# Patient Record
Sex: MALE | Race: OTHER | NOT HISPANIC OR LATINO | URBAN - METROPOLITAN AREA
[De-identification: names, ages, dates, MRNs, and addresses within clinical notes are randomized per-mention and may not be internally consistent; named-entity substitution may affect disease eponyms.]

---

## 2017-11-03 ENCOUNTER — INPATIENT (INPATIENT)
Facility: HOSPITAL | Age: 52
LOS: 0 days | Discharge: HOME | DRG: 872 | End: 2017-11-03
Attending: HOSPITALIST | Admitting: HOSPITALIST
Payer: MEDICAID

## 2017-11-03 ENCOUNTER — TRANSCRIPTION ENCOUNTER (OUTPATIENT)
Age: 52
End: 2017-11-03

## 2017-11-03 VITALS
SYSTOLIC BLOOD PRESSURE: 144 MMHG | TEMPERATURE: 103 F | HEART RATE: 118 BPM | RESPIRATION RATE: 20 BRPM | OXYGEN SATURATION: 96 % | DIASTOLIC BLOOD PRESSURE: 85 MMHG

## 2017-11-03 VITALS
TEMPERATURE: 100 F | RESPIRATION RATE: 20 BRPM | DIASTOLIC BLOOD PRESSURE: 81 MMHG | OXYGEN SATURATION: 98 % | HEART RATE: 85 BPM | SYSTOLIC BLOOD PRESSURE: 129 MMHG

## 2017-11-03 DIAGNOSIS — A41.9 SEPSIS, UNSPECIFIED ORGANISM: ICD-10-CM

## 2017-11-03 DIAGNOSIS — R50.9 FEVER, UNSPECIFIED: ICD-10-CM

## 2017-11-03 DIAGNOSIS — Z29.9 ENCOUNTER FOR PROPHYLACTIC MEASURES, UNSPECIFIED: ICD-10-CM

## 2017-11-03 DIAGNOSIS — B34.1 ENTEROVIRUS INFECTION, UNSPECIFIED: ICD-10-CM

## 2017-11-03 DIAGNOSIS — I10 ESSENTIAL (PRIMARY) HYPERTENSION: ICD-10-CM

## 2017-11-03 LAB
ALBUMIN SERPL ELPH-MCNC: 4.3 G/DL — SIGNIFICANT CHANGE UP (ref 3.3–5)
ALP SERPL-CCNC: 45 U/L — SIGNIFICANT CHANGE UP (ref 40–120)
ALT FLD-CCNC: 40 U/L RC — SIGNIFICANT CHANGE UP (ref 10–45)
ANION GAP SERPL CALC-SCNC: 14 MMOL/L — SIGNIFICANT CHANGE UP (ref 5–17)
APPEARANCE UR: CLEAR — SIGNIFICANT CHANGE UP
APTT BLD: 34.6 SEC — SIGNIFICANT CHANGE UP (ref 27.5–37.4)
AST SERPL-CCNC: 27 U/L — SIGNIFICANT CHANGE UP (ref 10–40)
BASE EXCESS BLDV CALC-SCNC: 1.5 MMOL/L — SIGNIFICANT CHANGE UP (ref -2–2)
BASOPHILS # BLD AUTO: 0 K/UL — SIGNIFICANT CHANGE UP (ref 0–0.2)
BASOPHILS NFR BLD AUTO: 0.2 % — SIGNIFICANT CHANGE UP (ref 0–2)
BILIRUB SERPL-MCNC: 0.7 MG/DL — SIGNIFICANT CHANGE UP (ref 0.2–1.2)
BILIRUB UR-MCNC: NEGATIVE — SIGNIFICANT CHANGE UP
BUN SERPL-MCNC: 11 MG/DL — SIGNIFICANT CHANGE UP (ref 7–23)
CALCIUM SERPL-MCNC: 8.8 MG/DL — SIGNIFICANT CHANGE UP (ref 8.4–10.5)
CHLORIDE SERPL-SCNC: 98 MMOL/L — SIGNIFICANT CHANGE UP (ref 96–108)
CO2 BLDV-SCNC: 26 MMOL/L — SIGNIFICANT CHANGE UP (ref 22–30)
CO2 SERPL-SCNC: 24 MMOL/L — SIGNIFICANT CHANGE UP (ref 22–31)
COLOR SPEC: SIGNIFICANT CHANGE UP
CREAT SERPL-MCNC: 1.25 MG/DL — SIGNIFICANT CHANGE UP (ref 0.5–1.3)
DIFF PNL FLD: ABNORMAL
EOSINOPHIL # BLD AUTO: 0 K/UL — SIGNIFICANT CHANGE UP (ref 0–0.5)
EOSINOPHIL NFR BLD AUTO: 0.1 % — SIGNIFICANT CHANGE UP (ref 0–6)
GAS PNL BLDV: SIGNIFICANT CHANGE UP
GAS PNL BLDV: SIGNIFICANT CHANGE UP
GLUCOSE SERPL-MCNC: 111 MG/DL — HIGH (ref 70–99)
GLUCOSE UR QL: NEGATIVE — SIGNIFICANT CHANGE UP
HCO3 BLDV-SCNC: 25 MMOL/L — SIGNIFICANT CHANGE UP (ref 21–29)
HCT VFR BLD CALC: 45.9 % — SIGNIFICANT CHANGE UP (ref 39–50)
HGB BLD-MCNC: 15.1 G/DL — SIGNIFICANT CHANGE UP (ref 13–17)
INR BLD: 1.16 RATIO — SIGNIFICANT CHANGE UP (ref 0.88–1.16)
KETONES UR-MCNC: NEGATIVE — SIGNIFICANT CHANGE UP
LEUKOCYTE ESTERASE UR-ACNC: NEGATIVE — SIGNIFICANT CHANGE UP
LYMPHOCYTES # BLD AUTO: 1.4 K/UL — SIGNIFICANT CHANGE UP (ref 1–3.3)
LYMPHOCYTES # BLD AUTO: 19.9 % — SIGNIFICANT CHANGE UP (ref 13–44)
MCHC RBC-ENTMCNC: 30.9 PG — SIGNIFICANT CHANGE UP (ref 27–34)
MCHC RBC-ENTMCNC: 32.9 GM/DL — SIGNIFICANT CHANGE UP (ref 32–36)
MCV RBC AUTO: 93.8 FL — SIGNIFICANT CHANGE UP (ref 80–100)
MONOCYTES # BLD AUTO: 0.9 K/UL — SIGNIFICANT CHANGE UP (ref 0–0.9)
MONOCYTES NFR BLD AUTO: 12.9 % — SIGNIFICANT CHANGE UP (ref 2–14)
NEUTROPHILS # BLD AUTO: 4.6 K/UL — SIGNIFICANT CHANGE UP (ref 1.8–7.4)
NEUTROPHILS NFR BLD AUTO: 66.9 % — SIGNIFICANT CHANGE UP (ref 43–77)
NITRITE UR-MCNC: NEGATIVE — SIGNIFICANT CHANGE UP
PCO2 BLDV: 38 MMHG — SIGNIFICANT CHANGE UP (ref 35–50)
PH BLDV: 7.43 — SIGNIFICANT CHANGE UP (ref 7.35–7.45)
PH UR: 6.5 — SIGNIFICANT CHANGE UP (ref 5–8)
PLATELET # BLD AUTO: 158 K/UL — SIGNIFICANT CHANGE UP (ref 150–400)
PO2 BLDV: 33 MMHG — SIGNIFICANT CHANGE UP (ref 25–45)
POTASSIUM SERPL-MCNC: 3.8 MMOL/L — SIGNIFICANT CHANGE UP (ref 3.5–5.3)
POTASSIUM SERPL-SCNC: 3.8 MMOL/L — SIGNIFICANT CHANGE UP (ref 3.5–5.3)
PROT SERPL-MCNC: 7 G/DL — SIGNIFICANT CHANGE UP (ref 6–8.3)
PROT UR-MCNC: NEGATIVE — SIGNIFICANT CHANGE UP
PROTHROM AB SERPL-ACNC: 12.7 SEC — SIGNIFICANT CHANGE UP (ref 9.8–12.7)
RAPID RVP RESULT: DETECTED
RBC # BLD: 4.89 M/UL — SIGNIFICANT CHANGE UP (ref 4.2–5.8)
RBC # FLD: 12.4 % — SIGNIFICANT CHANGE UP (ref 10.3–14.5)
RV+EV RNA SPEC QL NAA+PROBE: DETECTED
SAO2 % BLDV: 64 % — LOW (ref 67–88)
SODIUM SERPL-SCNC: 136 MMOL/L — SIGNIFICANT CHANGE UP (ref 135–145)
SP GR SPEC: 1.01 — LOW (ref 1.01–1.02)
UROBILINOGEN FLD QL: NEGATIVE — SIGNIFICANT CHANGE UP
WBC # BLD: 6.8 K/UL — SIGNIFICANT CHANGE UP (ref 3.8–10.5)
WBC # FLD AUTO: 6.8 K/UL — SIGNIFICANT CHANGE UP (ref 3.8–10.5)

## 2017-11-03 PROCEDURE — 85610 PROTHROMBIN TIME: CPT

## 2017-11-03 PROCEDURE — 82803 BLOOD GASES ANY COMBINATION: CPT

## 2017-11-03 PROCEDURE — 99285 EMERGENCY DEPT VISIT HI MDM: CPT | Mod: 25

## 2017-11-03 PROCEDURE — 85730 THROMBOPLASTIN TIME PARTIAL: CPT

## 2017-11-03 PROCEDURE — 87633 RESP VIRUS 12-25 TARGETS: CPT

## 2017-11-03 PROCEDURE — 84295 ASSAY OF SERUM SODIUM: CPT

## 2017-11-03 PROCEDURE — 87086 URINE CULTURE/COLONY COUNT: CPT

## 2017-11-03 PROCEDURE — 87040 BLOOD CULTURE FOR BACTERIA: CPT

## 2017-11-03 PROCEDURE — 93010 ELECTROCARDIOGRAM REPORT: CPT

## 2017-11-03 PROCEDURE — 81001 URINALYSIS AUTO W/SCOPE: CPT

## 2017-11-03 PROCEDURE — 71045 X-RAY EXAM CHEST 1 VIEW: CPT

## 2017-11-03 PROCEDURE — 99222 1ST HOSP IP/OBS MODERATE 55: CPT

## 2017-11-03 PROCEDURE — 71010: CPT | Mod: 26

## 2017-11-03 PROCEDURE — 71275 CT ANGIOGRAPHY CHEST: CPT

## 2017-11-03 PROCEDURE — 85027 COMPLETE CBC AUTOMATED: CPT

## 2017-11-03 PROCEDURE — 99223 1ST HOSP IP/OBS HIGH 75: CPT | Mod: GC

## 2017-11-03 PROCEDURE — 96374 THER/PROPH/DIAG INJ IV PUSH: CPT | Mod: XU

## 2017-11-03 PROCEDURE — 87798 DETECT AGENT NOS DNA AMP: CPT

## 2017-11-03 PROCEDURE — 84132 ASSAY OF SERUM POTASSIUM: CPT

## 2017-11-03 PROCEDURE — 80053 COMPREHEN METABOLIC PANEL: CPT

## 2017-11-03 PROCEDURE — 96375 TX/PRO/DX INJ NEW DRUG ADDON: CPT | Mod: XU

## 2017-11-03 PROCEDURE — 82435 ASSAY OF BLOOD CHLORIDE: CPT

## 2017-11-03 PROCEDURE — 86480 TB TEST CELL IMMUN MEASURE: CPT

## 2017-11-03 PROCEDURE — 87486 CHLMYD PNEUM DNA AMP PROBE: CPT

## 2017-11-03 PROCEDURE — 71275 CT ANGIOGRAPHY CHEST: CPT | Mod: 26

## 2017-11-03 PROCEDURE — 82947 ASSAY GLUCOSE BLOOD QUANT: CPT

## 2017-11-03 PROCEDURE — 93005 ELECTROCARDIOGRAM TRACING: CPT

## 2017-11-03 PROCEDURE — 85014 HEMATOCRIT: CPT

## 2017-11-03 PROCEDURE — 82330 ASSAY OF CALCIUM: CPT

## 2017-11-03 PROCEDURE — 83605 ASSAY OF LACTIC ACID: CPT

## 2017-11-03 PROCEDURE — 87581 M.PNEUMON DNA AMP PROBE: CPT

## 2017-11-03 RX ORDER — SODIUM CHLORIDE 9 MG/ML
3 INJECTION INTRAMUSCULAR; INTRAVENOUS; SUBCUTANEOUS ONCE
Qty: 0 | Refills: 0 | Status: COMPLETED | OUTPATIENT
Start: 2017-11-03 | End: 2017-11-03

## 2017-11-03 RX ORDER — AMLODIPINE BESYLATE 2.5 MG/1
1 TABLET ORAL
Qty: 0 | Refills: 0 | COMMUNITY

## 2017-11-03 RX ORDER — ACETAMINOPHEN 500 MG
650 TABLET ORAL EVERY 6 HOURS
Qty: 0 | Refills: 0 | Status: DISCONTINUED | OUTPATIENT
Start: 2017-11-03 | End: 2017-11-03

## 2017-11-03 RX ORDER — SODIUM CHLORIDE 9 MG/ML
15 INJECTION INTRAMUSCULAR; INTRAVENOUS; SUBCUTANEOUS THREE TIMES A DAY
Qty: 0 | Refills: 0 | Status: DISCONTINUED | OUTPATIENT
Start: 2017-11-03 | End: 2017-11-03

## 2017-11-03 RX ORDER — ACETAMINOPHEN 500 MG
650 TABLET ORAL ONCE
Qty: 0 | Refills: 0 | Status: COMPLETED | OUTPATIENT
Start: 2017-11-03 | End: 2017-11-03

## 2017-11-03 RX ORDER — AZITHROMYCIN 500 MG/1
500 TABLET, FILM COATED ORAL ONCE
Qty: 0 | Refills: 0 | Status: COMPLETED | OUTPATIENT
Start: 2017-11-03 | End: 2017-11-03

## 2017-11-03 RX ORDER — CEFTRIAXONE 500 MG/1
1 INJECTION, POWDER, FOR SOLUTION INTRAMUSCULAR; INTRAVENOUS ONCE
Qty: 0 | Refills: 0 | Status: COMPLETED | OUTPATIENT
Start: 2017-11-03 | End: 2017-11-03

## 2017-11-03 RX ORDER — INFLUENZA VIRUS VACCINE 15; 15; 15; 15 UG/.5ML; UG/.5ML; UG/.5ML; UG/.5ML
0.5 SUSPENSION INTRAMUSCULAR ONCE
Qty: 0 | Refills: 0 | Status: DISCONTINUED | OUTPATIENT
Start: 2017-11-03 | End: 2017-11-03

## 2017-11-03 RX ORDER — KETOROLAC TROMETHAMINE 30 MG/ML
15 SYRINGE (ML) INJECTION ONCE
Qty: 0 | Refills: 0 | Status: DISCONTINUED | OUTPATIENT
Start: 2017-11-03 | End: 2017-11-03

## 2017-11-03 RX ORDER — ACETAMINOPHEN 500 MG
2 TABLET ORAL
Qty: 0 | Refills: 0 | COMMUNITY
Start: 2017-11-03

## 2017-11-03 RX ORDER — SODIUM CHLORIDE 9 MG/ML
500 INJECTION INTRAMUSCULAR; INTRAVENOUS; SUBCUTANEOUS
Qty: 0 | Refills: 0 | Status: COMPLETED | OUTPATIENT
Start: 2017-11-03 | End: 2017-11-03

## 2017-11-03 RX ORDER — ACETAMINOPHEN 500 MG
1000 TABLET ORAL ONCE
Qty: 0 | Refills: 0 | Status: COMPLETED | OUTPATIENT
Start: 2017-11-03 | End: 2017-11-03

## 2017-11-03 RX ORDER — VANCOMYCIN HCL 1 G
1000 VIAL (EA) INTRAVENOUS ONCE
Qty: 0 | Refills: 0 | Status: COMPLETED | OUTPATIENT
Start: 2017-11-03 | End: 2017-11-03

## 2017-11-03 RX ADMIN — AZITHROMYCIN 250 MILLIGRAM(S): 500 TABLET, FILM COATED ORAL at 06:40

## 2017-11-03 RX ADMIN — SODIUM CHLORIDE 2000 MILLILITER(S): 9 INJECTION INTRAMUSCULAR; INTRAVENOUS; SUBCUTANEOUS at 04:15

## 2017-11-03 RX ADMIN — Medication 250 MILLIGRAM(S): at 09:54

## 2017-11-03 RX ADMIN — Medication 15 MILLIGRAM(S): at 06:00

## 2017-11-03 RX ADMIN — SODIUM CHLORIDE 2000 MILLILITER(S): 9 INJECTION INTRAMUSCULAR; INTRAVENOUS; SUBCUTANEOUS at 04:00

## 2017-11-03 RX ADMIN — SODIUM CHLORIDE 2000 MILLILITER(S): 9 INJECTION INTRAMUSCULAR; INTRAVENOUS; SUBCUTANEOUS at 03:50

## 2017-11-03 RX ADMIN — Medication 400 MILLIGRAM(S): at 04:53

## 2017-11-03 RX ADMIN — SODIUM CHLORIDE 2000 MILLILITER(S): 9 INJECTION INTRAMUSCULAR; INTRAVENOUS; SUBCUTANEOUS at 03:40

## 2017-11-03 RX ADMIN — CEFTRIAXONE 100 GRAM(S): 500 INJECTION, POWDER, FOR SOLUTION INTRAMUSCULAR; INTRAVENOUS at 06:00

## 2017-11-03 RX ADMIN — SODIUM CHLORIDE 3 MILLILITER(S): 9 INJECTION INTRAMUSCULAR; INTRAVENOUS; SUBCUTANEOUS at 03:49

## 2017-11-03 RX ADMIN — SODIUM CHLORIDE 2000 MILLILITER(S): 9 INJECTION INTRAMUSCULAR; INTRAVENOUS; SUBCUTANEOUS at 04:53

## 2017-11-03 RX ADMIN — Medication 650 MILLIGRAM(S): at 14:45

## 2017-11-03 NOTE — H&P ADULT - NSHPLABSRESULTS_GEN_ALL_CORE
EKG personally reviewed and interpreted :  sinus tachycardia, TWI VIII, aVF; TW flatting V5, V6    LABS personally reviewed and interpreted:             15.1   6.8   )-----------( 158      ( 2017 03:57 )             45.9     136  |  98  |  11  ----------------------------<  111<H>  3.8   |  24  |  1.25    Ca    8.8      2017 03:57    TPro  7.0  /  Alb  4.3  /  TBili  0.7  /  DBili  x   /  AST  27  /  ALT  40  /  AlkPhos  45  11-03    PT/INR - ( 2017 03:57 )   PT: 12.7 sec;   INR: 1.16 ratio    PTT - ( 2017 03:57 )  PTT:34.6 sec  Urinalysis Basic - ( 2017 05:47 )    Color: PL Yellow / Appearance: Clear / S.009 / pH: x  Gluc: x / Ketone: Negative  / Bili: Negative / Urobili: Negative   Blood: x / Protein: Negative / Nitrite: Negative   Leuk Esterase: Negative / RBC: 0-2 /HPF / WBC x   Sq Epi: x / Non Sq Epi: x / Bacteria: x      RADIOLOGY & ADDITIONAL TESTS:    Imaging Personally Reviewed:     CTA chest:  IMPRESSION:     No evidence of pulmonary embolism.  No focal infiltrate, pleural effusion or pneumothorax.  Nonspecific mildly prominent and borderline enlarged mediastinal and   hilar lymph nodes.  Diffuse hepatic steatosis.    Consultant(s) Notes Reviewed:  N/a    Care Discussed with Consultants/Other Providers: nursing, case management    Contact: Louise Woodward [phone]

## 2017-11-03 NOTE — H&P ADULT - PROBLEM SELECTOR PLAN 5
--patient is ambulatory and low risk, would encourage OOB for VTE prophylaxis instead of lovenox/heparin

## 2017-11-03 NOTE — ED PROVIDER NOTE - MEDICAL DECISION MAKING DETAILS
Resident: recent return from [place] with fever, chills, myalgias, blood-specked sputum. febrile, tachy. anterior lymphadenopathy. concern for sepsis, cannot r/o PE, TB. Will get sepsis labs, start abx, admit. Resident: recent return from [facility] with fever, chills, myalgias, blood-specked sputum. febrile, tachy. anterior lymphadenopathy. concern for sepsis, cannot r/o PE, TB. Will get sepsis labs, start abx, admit.  Attending Statement: Agree with the above.   Cough/SOB/hemoptysis c sepsis (tachy, febrile, tachypneic) after long plane flight and 2 mon stay in [facility] [place]  Will require admission, prophylactic abx, pan cx, w/u for TB (questionable +PPD in past).  Pt mentating and perfusing well, sepsis protocol fluids and abx initiated.  Reassess.  --BMM

## 2017-11-03 NOTE — ED ADULT NURSE NOTE - OBJECTIVE STATEMENT
53 yo m pmh of HTN came to ED c/o fever, ear pain bilaterally 53 yo m pmh of HTN came to ED c/o fever, ear pain bilaterally, sputum 53 yo m pmh of HTN came to ED c/o fever, ear pain bilaterally, blood speckled sputum, starting 5 days ago. Pt states that he has sore throat, nasal-congestion. Pt had returned from [place] yesterday at 18:00 but had developed the symptoms prior to traveling from [place].    Pt denies SOB, CP, back pain, n/v/d.  A&Ox4, pt lungs crackles bilaterally.  Abdomen soft, nondistended nontender.  No cough noted during assessment.  Pt is slightly tachycardic at 112, placed on cardiac monitor.  skin hot, dry, intact.  Placed on airborne precaution for presentation of symptoms.  Safety and comfort maintained.  Wife present at bedside.  Will continue to monitor.

## 2017-11-03 NOTE — H&P ADULT - PROBLEM SELECTOR PLAN 3
--low suspicion, but was placed on airborne isolation in ED based on travel history and minimal blood in sputum  --h/o TB vaccine as a child  --quant gold pending  --will check 3 sputum samples for AFB --low suspicion, but was placed on airborne isolation in ED based on travel history and minimal blood in sputum  --h/o TB vaccine as a child  --quant gold pending  --will check 3 sputum samples for AFB  --Will get ID eval for clearance

## 2017-11-03 NOTE — DISCHARGE NOTE ADULT - CARE PLAN
Principal Discharge DX:	Enteroviral infection  Goal:	Drink plenty of fluids and stay well hydrated. Take tylenol or ibuprofen for fever.  Instructions for follow-up, activity and diet:	You were admitted because you had a bad upper respiratory infection. The emergency room was concerned for tuberculosis based on your symptoms and travel history, so you were placed in isolation. The infectious disease doctors do not feel you have tuberculosis. We will follow up the blood tests that tested for TB. Follow up with your doctor within one to two weeks. Return to the ER if you have trouble breathing, chest pain, or cannot tolerate food and drink. Take tylenol for fever and body aches. You can take motrin as well.  Secondary Diagnosis:	Hypertension  Instructions for follow-up, activity and diet:	Do not take you amlodipine today and tomorrow. You can restart you amlodipine on Sunday if you are feeling well.

## 2017-11-03 NOTE — ED PROVIDER NOTE - OBJECTIVE STATEMENT
Resident: 52y M PM HTN presents with subjective fever x 2 days. Patient traveled back from [facility] yesterday, where he has been for the last 2 months. Patient noticed nasal congestion prior to flight. Patient noticed subjective fever 30 min after getting on plane. Took tylenol on plane. When he arrived home, took Nyquil. When patient tried to clear throat, sputum is blood-specked. Also complains of sore throat, chills, headache, myalgias, post-nasal dripear pressure.    PMD Steve Guerra ([facility])

## 2017-11-03 NOTE — CONSULT NOTE ADULT - SUBJECTIVE AND OBJECTIVE BOX
Patient is a 52y old  Male who presents with a chief complaint of fever (03 Nov 2017 11:42)    From HPI" HPI:  52M PMH HTN presents with fever. Patient states he got on an airplane from [place] about 36 hours ago. Started having fevers and chills and myalgias after the plane took off. Endorses nasal congestion prior to leaving. He continued to feel unwell, had sore throat, and had some mild trouble breathing so he came to the ED when he landed. Has some frothy sputum that was blood speckled. Also with mild HA. Never had symptoms like this before. Denies history of latent TB in himself or family members. Had BCG vaccine as a child. No cough, chest pain, dizziness, nausea, vomiting, abdominal pain, diarrhea, LE edema. No weight loss, no night sweats.     In ED VS Tmax 103  /38 O2sat % on RA but felt a little better with supplemental O2. He received empiric Abx coverage for PNA in the ED. CTA chest was negative for PE. Labs WNL. RVP positive entero/rhinovirus. BCx sent. UA negative. (03 Nov 2017 11:42)  "    Above verified-agree with above unless noted below.  Patient jhad some nasal bleed  Placed in isolation to r/o TB  Ddenies coughing out blood  No night sweats or weight loss  In [place] X 2.5 months  Not on malaria prophylaxis  No vaccinations  Was not sick or with resp symptoms prior to boarding  No Family hx of TB  No IVDO or incarceration  prior PPD negative in 1989    ID consulted     PAST MEDICAL & SURGICAL HISTORY:  Hypertension  No significant past surgical history      Social history:  no    Smoking,    ETOH,      IVDU       FAMILY HISTORY:  Family history of coronary artery disease (Father)  - Reviewed,Non contributory   REVIEW OF SYSTEMS  General:	as in HPI    Skin:No rash  	  Ophthalmologic:Denies any visual complaints,discharge redness or photophobia  	  ENMT:No nasal discharge,headache,sinus congestion or throat pain.No dental complaints    Respiratory and Thorax:As in HPI  	  Cardiovascular:	No chest pain,palpitaions or dizziness    Gastrointestinal:	NO nausea,abdominal pain or diarrhea.    Genitourinary:	No dysuria,frequency. No flank pain    Musculoskeletal:	No joint swelling or pain.No weakness    Neurological:No confusion,diziness.No extremity weakness.No bladder or bowel incontinence	    Psychiatric:No delusions or hallucinations	    Hematology/Lymphatics:	No LN swelling.No gum bleeding     Endocrine:	No recent weight gain or loss.No abnormal heat/cold intolerance    Allergic/Immunologic:	No hives or rash   Allergies    No Known Allergies    Intolerances        Antimicrobials:  Ceftriaxone,zithro,vanco x 1 dced        Vital Signs Last 24 Hrs  T(C): 37.1 (03 Nov 2017 14:06), Max: 39.6 (03 Nov 2017 03:41)  T(F): 98.8 (03 Nov 2017 14:06), Max: 103.2 (03 Nov 2017 03:41)  HR: 77 (03 Nov 2017 14:06) (77 - 118)  BP: 128/78 (03 Nov 2017 14:06) (112/73 - 158/92)  BP(mean): --  RR: 20 (03 Nov 2017 14:06) (20 - 30)  SpO2: 98% (03 Nov 2017 14:06) (93% - 100%)    PHYSICAL EXAM:Pleasant patient in no acute distress.      Constitutional:Comfortable.Awake and alert  No cachexia     Eyes:PERRL EOMI.NO discharge or conjunctival injection    ENMT:No sinus tenderness.No thrush.No pharyngeal exudate or erythema.Fair dental hygiene + sinus congestion    Neck:Supple,No LN,no JVD      Respiratory:Good air entry bilaterally,CTA    Cardiovascular:S1 S2 wnl, No murmurs,rub or gallops    Gastrointestinal:Soft BS(+) no tenderness no masses ,No rebound or guarding    Genitourinary:No CVA tendereness     Rectal:    Extremities:No cyanosis,clubbing or edema.    Vascular:peripheral pulses felt    Neurological:AAO X 3,No grossly focal deficits    Skin:No rash     Lymph Nodes:No palpable LNs    Musculoskeletal:No joint swelling or LOM    Psychiatric:Affect normal.          Labs:                            15.1   6.8   )-----------( 158      ( 03 Nov 2017 03:57 )             45.9         11-03    136  |  98  |  11  ----------------------------<  111<H>  3.8   |  24  |  1.25    Ca    8.8      03 Nov 2017 03:57    TPro  7.0  /  Alb  4.3  /  TBili  0.7  /  DBili  x   /  AST  27  /  ALT  40  /  AlkPhos  45  11-03      Rapid Respiratory Viral Panel (11.03.17 @ 04:16)    Rapid RVP Result: Detected:   Entero/Rhinovirus (RapRVP): Detected    Blood cx testing      < from: CT Angio Chest w/ IV Cont (11.03.17 @ 05:21) >  IMPRESSION:     No evidence of pulmonary embolism.  No focal infiltrate, pleural effusion or pneumothorax.  Nonspecific mildly prominent and borderline enlarged mediastinal and   hilar lymph nodes.  Diffuse hepatic steatosis.          < end of copied text >

## 2017-11-03 NOTE — CONSULT NOTE ADULT - ASSESSMENT
53 yo with HTN  rhino/entero viral URI  No s/s PNA or LRTI  blood in sputum likely from nasal congestion-says 1 episode  No clinical or radiological suspicion of Pulm TB  No s/s any bacterial superinfection  No clinical or lab evidence of malaria or other travel related illness  Rec:  1) DC sputum AFB and airborne precautions  2) Floow blood Cx  3) symptomatic care for URI  if DCed home to follow with PCP-advised about s/s of worsening and bacterial superinfection-to come back to ER/PCP if worsening or if does not improve in 4-5 days  Case d/w Dr reyes  other plan per primary team  Infectious Diseases Service will cover over weekend.  Please call [phone] if issues

## 2017-11-03 NOTE — ED PROVIDER NOTE - PROGRESS NOTE DETAILS
Resident: labs wnl. CXR wnl. CT negative for PE. RVP positive for enterovirus. Patient still tachy, febrile despite fluids, antipyretics, fluids. Will admit for TB r/o. Resident: labs wnl. CXR wnl. CT negative for PE. RVP positive for enterovirus. Patient still tachy, febrile despite fluids, antipyretics, fluids. Concern for more esoteric infection, ALYSIA, MERS, etc. Hx positive PPD in past. Will admit for TB r/o, blood culture results.

## 2017-11-03 NOTE — DISCHARGE NOTE ADULT - HOSPITAL COURSE
52M PMH HTN presented with fever. He had symptoms consistent with a URI (high fever, myalgias, sore throat, nasal congestion). He mentioned hemoptysis (very small amount x 1 and travel from [place]) and so he was placed on isolation to rule out TB. Of note, CTA chest was neg for pneumonia or PE. Initially received empiric antibiotics in the ED, but after testing positive for entero/rhinovirus, antibiotics were not continued. While in isolation on the floor, he was seen by infectious disease who deemed this patient's presentation was very low risk and not consistent with TB; so isolation was discontinued. His VS (initially tachycardic and febrile) improved with IVF. He was discharged in improved and stable condition. He was instructed to follow up with his primary care doctor within 1-2 weeks. Primary team will follow up his QuantiFeron gold test.

## 2017-11-03 NOTE — ED PROVIDER NOTE - NS ED ROS FT
Constitutional: +fever, +chills.  Eyes: no visual changes.  ENMT: +sore throat.  CV: no chest pain.  Resp: no cough, no shortness of breath.  GI: no abdominal pain, no nausea, no vomiting, no diarrhea.  : no dysuria, no hematuria.  MSK: no back pain, no neck pain.  Skin: no rashes.  Neuro: +headache, no loss of consciousness, no weakness, no numbness, no tingling.  Psych: no known mental health issues.  Endo: no diabetes, no thyroid trouble.

## 2017-11-03 NOTE — DISCHARGE NOTE ADULT - PROVIDER TOKENS
FREE:[LAST:[CHAVIRA],FIRST:[FRANCO],PHONE:[(   )    -],FAX:[(   )    -],ADDRESS:[PRIMARY CARE PHYSICIAN]]

## 2017-11-03 NOTE — H&P ADULT - PROBLEM SELECTOR PLAN 1
--positive RVP with typical viral illness symptoms  --well appearing at this time  --would monitor off antibiotics at this time (received empiric coverage in ED) as viral source identified and no focal sign of bacterial infection  --tylenol PRN for fever, myalgias  --encourage PO fluids

## 2017-11-03 NOTE — ED PROVIDER NOTE - CARE PLAN
Principal Discharge DX:	Fever  Secondary Diagnosis:	Tachycardia Principal Discharge DX:	Enteroviral infection  Secondary Diagnosis:	Sepsis, due to unspecified organism

## 2017-11-03 NOTE — H&P ADULT - NSHPREVIEWOFSYSTEMS_GEN_ALL_CORE
REVIEW OF SYSTEMS:  Constitutional: [X] fevers [X ] chills [ ] weight loss [ ] weight gain  HEENT: [ ] dry eyes [ ] eye irritation [ ] postnasal drip [ ] nasal congestion  CV: [ ] chest pain [ ] orthopnea [ ] palpitations [ ] murmur  Resp: [ ] cough [X ] shortness of breath [ ] dyspnea [ ] wheezing [X] sputum [X ] hemoptysis  GI: [ ] nausea [ ] vomiting [ ] diarrhea [ ] constipation [ ] abd pain [ ] dysphagia   : [ ] dysuria [ ] nocturia [ ] hematuria [ ] increased urinary frequency  Musculoskeletal: [ ] back pain [ X] myalgias [ X] arthralgias [ ] fracture  Skin: [ ] rash [ ] itch  Neurological: [X ] headache [ ] dizziness [ ] syncope [ ] weakness [ ] numbness  Psychiatric: [ ] anxiety [ ] depression  Endocrine: [ ] diabetes [ ] thyroid problem  Hematologic/Lymphatic: [ ] anemia [ ] bleeding problem  Allergic/Immunologic: [ ] itchy eyes [X ] nasal discharge [ ] hives [ ] angioedema  [ X] All other systems negative REVIEW OF SYSTEMS:  Constitutional: [X] fevers [X ] chills [ -] weight loss [ -] weight gain  HEENT: [- ] dry eyes [- ] eye irritation [ -] postnasal drip [ ] nasal congestion  CV: [- ] chest pain [- ] orthopnea [- ] palpitations [ -] murmur  Resp: [ +] cough [X ] shortness of breath [- ] dyspnea [ -] wheezing [X] sputum [X ] hemoptysis  GI: [- ] nausea [ -] vomiting [ -] diarrhea [- ] constipation [-] abd pain [ -] dysphagia   : [ -] dysuria [ -] nocturia [ -] hematuria [ ] increased urinary frequency  Musculoskeletal: [- ] back pain [ X] myalgias [ X] arthralgias [ ] fracture  Skin: [ -] rash [ -] itch  Neurological: [X ] headache [ ] dizziness [ ] syncope [ ] weakness [ ] numbness  Psychiatric: [ -] anxiety [- ] depression  Endocrine: [- ] diabetes [ -] thyroid problem  Hematologic/Lymphatic: [-] anemia [ -] bleeding problem  Allergic/Immunologic: [ ] itchy eyes [X ] nasal discharge [ ] hives [ ] angioedema  [ X] All other systems negative

## 2017-11-03 NOTE — H&P ADULT - ASSESSMENT
52M PMH HTN presents after recent travel from [place] with fevers/myalgias, found to have sepsis 2/2 entero/rhinovirus 52M PMH HTN presents after recent travel from [place] with fevers/myalgias, found to have sepsis 2/2 entero/rhinovirus, admitted to rule out TB. Stable. 52M PMH HTN presents after recent travel from [place] with fevers/myalgias, found to have sepsis 2/2 Viral illness ( entero/rhinovirus), admitted to rule out TB. Stable.

## 2017-11-03 NOTE — H&P ADULT - PROBLEM SELECTOR PLAN 2
--Met sepsis criteria on admission, but no longer tachycardic or febrile  --chest imaging WNL, UA negative, f/u UCx and BCx  --received empiric Abx in ED, would monitor off as has positive viral source identified  --already fluid resuscitated, encourage PO fluids

## 2017-11-03 NOTE — DISCHARGE NOTE ADULT - MEDICATION SUMMARY - MEDICATIONS TO TAKE
I will START or STAY ON the medications listed below when I get home from the hospital:    acetaminophen 325 mg oral tablet  -- 2 tab(s) by mouth every 6 hours, As needed, For Temp greater than 38 C (100.4 F)  -- Indication: For Pain or fever    amLODIPine 5 mg oral tablet  -- 1 tab(s) by mouth once a day. Do not take your amlodipine on Friday or Saturday. You can restart your amlodipine on Sunday if you are feeling better.   -- Indication: For Hypertension

## 2017-11-03 NOTE — H&P ADULT - NSHPPHYSICALEXAM_GEN_ALL_CORE
Vital Signs Last 24 Hrs  T(C): 37.2 (11-03-17 @ 11:20)  T(F): 99 (11-03-17 @ 11:20), Max: 103.2 (11-03-17 @ 03:41)  HR: 82 (11-03-17 @ 11:20) (82 - 118)  BP: 128/83 (11-03-17 @ 11:20)  BP(mean): --  RR: 20 (11-03-17 @ 11:20) (20 - 30)  SpO2: 100% (11-03-17 @ 11:20) (93% - 100%)    PHYSICAL EXAM:  GENERAL: NAD, well-developed  HEAD:  Atraumatic, Normocephalic  EYES: EOMI, PERRLA, conjunctiva and sclera clear  NECK: Supple, No JVD  CHEST/LUNG: Clear to auscultation bilaterally; No wheeze  HEART: Regular rate and rhythm; No murmurs, rubs, or gallops  ABDOMEN: Soft, Nontender, Nondistended; Bowel sounds present  EXTREMITIES:  2+ Peripheral Pulses, No clubbing, cyanosis, or edema  PSYCH: AAOx3  NEUROLOGY: non-focal  SKIN: No rashes or lesions Vital Signs Last 24 Hrs  T(C): 37.2 (11-03-17 @ 11:20)  T(F): 99 (11-03-17 @ 11:20), Max: 103.2 (11-03-17 @ 03:41)  HR: 82 (11-03-17 @ 11:20) (82 - 118)  BP: 128/83 (11-03-17 @ 11:20)  BP(mean): --  RR: 20 (11-03-17 @ 11:20) (20 - 30)  SpO2: 100% (11-03-17 @ 11:20) (93% - 100%)    PHYSICAL EXAM:  GENERAL: NAD, well-developed  HEAD:  Atraumatic, Normocephalic, mild pharyngeal erythema  EYES: EOMI, PERRLA, conjunctiva and sclera clear  NECK: Supple, No JVD  CHEST/LUNG: Clear to auscultation bilaterally; No wheeze  HEART: Regular rate and rhythm; No murmurs, rubs, or gallops  ABDOMEN: Soft, Nontender, Nondistended; Bowel sounds present  EXTREMITIES:  2+ Peripheral Pulses, No clubbing, cyanosis, or edema  PSYCH: AAOx3  NEUROLOGY: non-focal  SKIN: No rashes or lesions

## 2017-11-03 NOTE — DISCHARGE NOTE ADULT - PATIENT PORTAL LINK FT
“You can access the [facility] Patient Portal, offered by [facility], by registering with the following website: [email or web address]”

## 2017-11-03 NOTE — ED PROVIDER NOTE - PHYSICAL EXAMINATION
Resident: Gen: no acute distress; Head: NC, AT; ENT: PERRL, MMM, bilateral TM wnl; Neck: anterior lymphadenopathy; Chest: CTAB, no retractions, rate normal, appears to breathe comfortably; Heart: tachy regular S1S2 No JVD No peripheral edema b/l pulses 2+ in arms and legs; Abd: Soft non-tender, no rebound or guarding, no CVAT; Back: No spinal deformity; Ext: Moving all 4 extremities without obvious impairment to ROM, no obvious weakness; Neuro: fluid speech; Psych: No anxiety, depression or pressured speech noted; Skin: no urticaria, no diffuse rash.

## 2017-11-03 NOTE — DISCHARGE NOTE ADULT - PLAN OF CARE
Drink plenty of fluids and stay well hydrated. Take tylenol or ibuprofen for fever. You were admitted because you had a bad upper respiratory infection. The emergency room was concerned for tuberculosis based on your symptoms and travel history, so you were placed in isolation. The infectious disease doctors do not feel you have tuberculosis. We will follow up the blood tests that tested for TB. Follow up with your doctor within one to two weeks. Return to the ER if you have trouble breathing, chest pain, or cannot tolerate food and drink. Take tylenol for fever and body aches. You can take motrin as well. Do not take you amlodipine today and tomorrow. You can restart you amlodipine on Sunday if you are feeling well.

## 2017-11-03 NOTE — H&P ADULT - HISTORY OF PRESENT ILLNESS
52M PMH HTN presents with fever. Patient states he got on an airplane from [place] about 36 hours ago. Started having fevers and chills and myalgias after the plane took off. Endorses nasal congestion prior to leaving. He continued to feel unwell, had sore throat, and had some mild trouble breathing so he came to the ED when he landed. Has some frothy sputum that was blood speckled. Also with mild HA. Never had symptoms like this before. Denies history of latent TB in himself or family members. Had BCG vaccine as a child. No cough, chest pain, dizziness, nausea, vomiting, abdominal pain, diarrhea, LE edema. No weight loss, no night sweats.     In ED VS Tmax 103  /38 O2sat % on RA but felt a little better with supplemental O2. He received empiric Abx coverage for PNA in the ED. CTA chest was negative for PE. Labs WNL. RVP positive entero/rhinovirus. BCx sent. UA negative.

## 2017-11-04 LAB
CULTURE RESULTS: NO GROWTH — SIGNIFICANT CHANGE UP
M TB TUBERC IFN-G BLD QL: -0.01 IU/ML — SIGNIFICANT CHANGE UP
M TB TUBERC IFN-G BLD QL: 0.08 IU/ML — SIGNIFICANT CHANGE UP
M TB TUBERC IFN-G BLD QL: NEGATIVE — SIGNIFICANT CHANGE UP
MITOGEN IGNF BCKGRD COR BLD-ACNC: >10 IU/ML — SIGNIFICANT CHANGE UP
SPECIMEN SOURCE: SIGNIFICANT CHANGE UP

## 2017-11-08 LAB
CULTURE RESULTS: SIGNIFICANT CHANGE UP
CULTURE RESULTS: SIGNIFICANT CHANGE UP
SPECIMEN SOURCE: SIGNIFICANT CHANGE UP
SPECIMEN SOURCE: SIGNIFICANT CHANGE UP

## 2023-06-20 ENCOUNTER — APPOINTMENT (OUTPATIENT)
Dept: UROLOGY | Facility: CLINIC | Age: 58
End: 2023-06-20
Payer: MEDICAID

## 2023-06-20 VITALS
HEART RATE: 68 BPM | RESPIRATION RATE: 16 BRPM | WEIGHT: 175 LBS | SYSTOLIC BLOOD PRESSURE: 148 MMHG | DIASTOLIC BLOOD PRESSURE: 91 MMHG | TEMPERATURE: 98.1 F | BODY MASS INDEX: 26.52 KG/M2 | HEIGHT: 68 IN

## 2023-06-20 PROCEDURE — 51798 US URINE CAPACITY MEASURE: CPT

## 2023-06-20 PROCEDURE — 99204 OFFICE O/P NEW MOD 45 MIN: CPT

## 2023-06-20 RX ORDER — TAMSULOSIN HYDROCHLORIDE 0.4 MG/1
0.4 CAPSULE ORAL
Qty: 30 | Refills: 6 | Status: COMPLETED | COMMUNITY
Start: 2023-06-20 | End: 2024-01-16

## 2023-06-21 LAB — PSA SERPL-MCNC: 0.81 NG/ML

## 2023-06-21 NOTE — HISTORY OF PRESENT ILLNESS
[Urinary Frequency] : urinary frequency [Nocturia] : nocturia [FreeTextEntry1] : : 1965 \par Referring Provider: none \par \par HPI: Mr. MADAY CHAVIRA is a 58 year yo M with a PMHx notable for new found LUTS started about 6 months ago. He notes weak urinary stream. He works as a  and notes weaker stream when he urinates every 4-5 hours. Has also noted increased nocturia to 2-3x a night. He does not hold his urine. He does note that he is able to hold his urine if he needs to. PVR 6 cc. He has been on tamsulosin 0.4 mg QHS but hasn't been taking for the last 1 month.\par \par We discussed natural history of BPH and PSA. We discussed the fact that PSA is a nonspecific test for cancer although it is prostate specific. We have reviewed the fact that elevations can be caused by multiple separate processes with the prostate including prostate cancer, benign prostate enlargement, prostate inflammation or infection, or combination of any of the above. We also reviewed that procedures involving catheterizations or manipulation of the prostate including colonoscopy could cause PSA to be elevated. I also have reviewed with the patient that there is age specificity related to PSA and that over time there is some expectation that the PSA will slowly rise over time.\par \par Anticoagulation: None\par All: NKDA\par Social: works as a , \par PMHx: HTN on amlodipine\par FHx: no cancer in family\par PSHx: None\par Labs: no recent PSA\par  [Urinary Incontinence] : no urinary incontinence [Urinary Retention] : no urinary retention [Urinary Urgency] : no urinary urgency

## 2023-08-22 ENCOUNTER — APPOINTMENT (OUTPATIENT)
Dept: UROLOGY | Facility: CLINIC | Age: 58
End: 2023-08-22
Payer: MEDICAID

## 2023-08-22 ENCOUNTER — OUTPATIENT (OUTPATIENT)
Dept: OUTPATIENT SERVICES | Facility: HOSPITAL | Age: 58
LOS: 1 days | End: 2023-08-22
Payer: MEDICAID

## 2023-08-22 DIAGNOSIS — Z12.5 ENCOUNTER FOR SCREENING FOR MALIGNANT NEOPLASM OF PROSTATE: ICD-10-CM

## 2023-08-22 DIAGNOSIS — R39.9 UNSPECIFIED SYMPTOMS AND SIGNS INVOLVING THE GENITOURINARY SYSTEM: ICD-10-CM

## 2023-08-22 DIAGNOSIS — R35.0 FREQUENCY OF MICTURITION: ICD-10-CM

## 2023-08-22 PROCEDURE — 99213 OFFICE O/P EST LOW 20 MIN: CPT | Mod: 25

## 2023-08-22 PROCEDURE — 76872 US TRANSRECTAL: CPT | Mod: 26

## 2023-08-22 PROCEDURE — 76872 US TRANSRECTAL: CPT

## 2023-08-22 NOTE — ASSESSMENT
[FreeTextEntry1] : 59 yo M with LUTS And screening PSA 0.81 ng/mL  #Screening PSA - PSA within range   #LUTS - Stable on tamsulosin - Refill medication today  RPA 1 year

## 2023-08-22 NOTE — HISTORY OF PRESENT ILLNESS
[FreeTextEntry1] : : 1965  Referring Provider: none   HPI: Mr. MADAY CHAVIRA is a 58 year yo M with a PMHx notable for new found LUTS started about 6 months ago. He notes weak urinary stream. He works as a  and notes weaker stream when he urinates every 4-5 hours. Has also noted increased nocturia to 2-3x a night. He does not hold his urine. He does note that he is able to hold his urine if he needs to. PVR 6 cc. He has been on tamsulosin 0.4 mg QHS but hasn't been taking for the last 1 month.  We discussed natural history of BPH and PSA. We discussed the fact that PSA is a nonspecific test for cancer although it is prostate specific. We have reviewed the fact that elevations can be caused by multiple separate processes with the prostate including prostate cancer, benign prostate enlargement, prostate inflammation or infection, or combination of any of the above. We also reviewed that procedures involving catheterizations or manipulation of the prostate including colonoscopy could cause PSA to be elevated. I also have reviewed with the patient that there is age specificity related to PSA and that over time there is some expectation that the PSA will slowly rise over time.  Anticoagulation: None All: NKDA Social: works as a ,  PMHx: HTN on amlodipine FHx: no cancer in family PSHx: None Labs: no recent PSA  : Doing well today, nocturia x 1. Emptying well.  PSA 0.81 ng/mL, d/w patient. RPA 1 year for PSA recheck. No ED today. No urinary urgency. TRUS today with 25 cc gland.  Doing well, no additional medications. [Urinary Incontinence] : no urinary incontinence [Urinary Retention] : no urinary retention [Urinary Urgency] : urinary urgency [Urinary Frequency] : urinary frequency

## 2023-08-23 DIAGNOSIS — Z12.5 ENCOUNTER FOR SCREENING FOR MALIGNANT NEOPLASM OF PROSTATE: ICD-10-CM

## 2023-08-23 DIAGNOSIS — R39.9 UNSPECIFIED SYMPTOMS AND SIGNS INVOLVING THE GENITOURINARY SYSTEM: ICD-10-CM

## 2024-02-14 ENCOUNTER — APPOINTMENT (OUTPATIENT)
Dept: ORTHOPEDIC SURGERY | Facility: CLINIC | Age: 59
End: 2024-02-14
Payer: MEDICAID

## 2024-02-14 VITALS
BODY MASS INDEX: 27.28 KG/M2 | WEIGHT: 180 LBS | HEIGHT: 68 IN | HEART RATE: 77 BPM | DIASTOLIC BLOOD PRESSURE: 85 MMHG | SYSTOLIC BLOOD PRESSURE: 143 MMHG

## 2024-02-14 DIAGNOSIS — M54.16 RADICULOPATHY, LUMBAR REGION: ICD-10-CM

## 2024-02-14 DIAGNOSIS — M43.16 SPONDYLOLISTHESIS, LUMBAR REGION: ICD-10-CM

## 2024-02-14 PROCEDURE — 99203 OFFICE O/P NEW LOW 30 MIN: CPT

## 2024-02-14 NOTE — PHYSICAL EXAM
[de-identified] : Examination of the lumbar spine reveals no midline tenderness palpation, step-offs, or skin lesions. Decreased range of motion with respect to flexion, extension, lateral bending, and rotation. No tenderness to palpation of the sciatic notch. No tenderness palpation of the bilateral greater trochanters. No pain with passive internal/external rotation of the hips. No instability of bilateral lower extremities.  Negative CULLEN. Negative straight leg raise bilaterally. No bowstring. Negative femoral stretch. 5 out of 5 iliopsoas, hip abductors, hips adductors, quadriceps, hamstrings, gastrocsoleus, tibialis anterior, extensor hallucis longus, peroneals. Grossly intact sensation to light touch bilateral lower extremities. 1+ patellar and Achilles reflexes. Downgoing Babinski. No clonus. Intact proprioception. Palpable pulses. No skin lesion and no edema on the right and left lower extremities. [de-identified] : Review of his AP lateral lumbar x-rays does reveal some slight L5-S1 spondylolisthesis.  No aggressive lesions.

## 2024-02-14 NOTE — HISTORY OF PRESENT ILLNESS
[de-identified] : Mr. MADAY CHAVIRA  is a 58 year old male who presents with right buttock/groin/thigh pain since the end of January.  He has taken naprosyn and his symptoms are 50% better.   His thigh is his biggest pain.  Walking was the most difficult.  He had to wear slip on shoes because lifting his leg was painful.  Denies any low back or LE radicular symptoms.  Normal bowel and bladder control.   Denies any recent fevers, chills, sweats, weight loss, or infection.  The patients past medical history, past surgical history, medications, allergies, and social history were reviewed by me today with the patient and documented accordingly.  In addition, the patient's family history, which is noncontributory to their visit, was also reviewed.

## 2024-02-14 NOTE — DISCUSSION/SUMMARY
[de-identified] : We discussed further treatment options.  Overall, he is vastly improved.  His symptomatology appears radicular in nature at times but also possibly related to hip pathology.  His x-rays without significant arthritis.  I am unable to reproduce any of his symptoms with physical exam maneuvers today.  At this point he wished to continue with conservative measures as he is feeling much better.  He will let me know of any changes or worsening of his symptoms.

## 2024-08-26 ENCOUNTER — APPOINTMENT (OUTPATIENT)
Dept: UROLOGY | Facility: CLINIC | Age: 59
End: 2024-08-26
Payer: COMMERCIAL

## 2024-08-26 VITALS — SYSTOLIC BLOOD PRESSURE: 116 MMHG | DIASTOLIC BLOOD PRESSURE: 76 MMHG

## 2024-08-26 DIAGNOSIS — R39.9 UNSPECIFIED SYMPTOMS AND SIGNS INVOLVING THE GENITOURINARY SYSTEM: ICD-10-CM

## 2024-08-26 DIAGNOSIS — Z12.5 ENCOUNTER FOR SCREENING FOR MALIGNANT NEOPLASM OF PROSTATE: ICD-10-CM

## 2024-08-26 PROCEDURE — 99213 OFFICE O/P EST LOW 20 MIN: CPT

## 2024-08-26 NOTE — ASSESSMENT
[FreeTextEntry1] : 58 yo M with screening PSA and LUTS  #LUTS - Plan for continuing tamsulosin as needed - Advised he can take if he notes worsening of his LUTS  #Screening PSA - PSA Today - We discussed the fact that PSA is a nonspecific test for cancer although it is prostate specific. We have reviewed the fact that elevations can be caused by multiple separate processes with the prostate including prostate cancer, benign prostate enlargement, prostate inflammation or infection, or combination of any of the above. We also reviewed that procedures involving catheterizations or manipulation of the prostate including colonoscopy could cause PSA to be elevated. I also have reviewed with the patient that there is age specificity related to PSA and that over time there is some expectation that the PSA will slowly rise over time  RPA 1 year

## 2024-08-26 NOTE — HISTORY OF PRESENT ILLNESS
[FreeTextEntry1] : : 1965  Referring Provider: none   HPI: Mr. MADAY CHAVIRA is a 58 year yo M with a PMHx notable for new found LUTS started about 6 months ago. He notes weak urinary stream. He works as a  and notes weaker stream when he urinates every 4-5 hours. Has also noted increased nocturia to 2-3x a night. He does not hold his urine. He does note that he is able to hold his urine if he needs to. PVR 6 cc. He has been on tamsulosin 0.4 mg QHS but hasn't been taking for the last 1 month.  We discussed natural history of BPH and PSA. We discussed the fact that PSA is a nonspecific test for cancer although it is prostate specific. We have reviewed the fact that elevations can be caused by multiple separate processes with the prostate including prostate cancer, benign prostate enlargement, prostate inflammation or infection, or combination of any of the above. We also reviewed that procedures involving catheterizations or manipulation of the prostate including colonoscopy could cause PSA to be elevated. I also have reviewed with the patient that there is age specificity related to PSA and that over time there is some expectation that the PSA will slowly rise over time.  Anticoagulation: None All: NKDA Social: works as a ,  PMHx: HTN on amlodipine FHx: no cancer in family PSHx: None Labs: no recent PSA  : Doing well today, nocturia x 1. Emptying well.  PSA 0.81 ng/mL, d/w patient. RPA 1 year for PSA recheck. No ED today. No urinary urgency. TRUS today with 25 cc gland.  Doing well, no additional medications.  24 Pt presents for annual f/u for PSA check and BPH/LUTS- no pain- continues with weak stream- did not take Tamsulosin for the past 6-7 months( new script sent) - no issues with ED. He has not been taking tamsulosin as he states that he has not had significant symptoms. Feels he is emptying . Plan for new PSA today.  [Urinary Incontinence] : no urinary incontinence [Urinary Retention] : no urinary retention [Urinary Urgency] : no urinary urgency [Urinary Frequency] : no urinary frequency

## 2024-08-28 ENCOUNTER — TRANSCRIPTION ENCOUNTER (OUTPATIENT)
Age: 59
End: 2024-08-28

## 2024-09-02 ENCOUNTER — TRANSCRIPTION ENCOUNTER (OUTPATIENT)
Age: 59
End: 2024-09-02

## 2024-09-02 LAB — PSA SERPL-MCNC: 0.78 NG/ML

## 2025-08-26 ENCOUNTER — APPOINTMENT (OUTPATIENT)
Dept: UROLOGY | Facility: CLINIC | Age: 60
End: 2025-08-26